# Patient Record
Sex: FEMALE | Race: WHITE | NOT HISPANIC OR LATINO | Employment: FULL TIME
[De-identification: names, ages, dates, MRNs, and addresses within clinical notes are randomized per-mention and may not be internally consistent; named-entity substitution may affect disease eponyms.]

---

## 2017-07-08 ENCOUNTER — HEALTH MAINTENANCE LETTER (OUTPATIENT)
Age: 47
End: 2017-07-08

## 2019-09-13 ENCOUNTER — APPOINTMENT (OUTPATIENT)
Dept: ULTRASOUND IMAGING | Facility: CLINIC | Age: 49
End: 2019-09-13
Attending: EMERGENCY MEDICINE
Payer: COMMERCIAL

## 2019-09-13 ENCOUNTER — HOSPITAL ENCOUNTER (EMERGENCY)
Facility: CLINIC | Age: 49
Discharge: HOME | End: 2019-09-13
Attending: EMERGENCY MEDICINE | Admitting: EMERGENCY MEDICINE
Payer: COMMERCIAL

## 2019-09-13 VITALS
BODY MASS INDEX: 29.05 KG/M2 | DIASTOLIC BLOOD PRESSURE: 75 MMHG | RESPIRATION RATE: 16 BRPM | HEART RATE: 60 BPM | TEMPERATURE: 98.4 F | SYSTOLIC BLOOD PRESSURE: 115 MMHG | WEIGHT: 180 LBS | OXYGEN SATURATION: 99 %

## 2019-09-13 DIAGNOSIS — R10.13 ABDOMINAL PAIN, EPIGASTRIC: ICD-10-CM

## 2019-09-13 LAB
ALBUMIN SERPL-MCNC: 4 G/DL (ref 3.4–5)
ALBUMIN UR-MCNC: NEGATIVE MG/DL
ALP SERPL-CCNC: 66 U/L (ref 40–150)
ALT SERPL W P-5'-P-CCNC: 21 U/L (ref 0–50)
ANION GAP SERPL CALCULATED.3IONS-SCNC: 7 MMOL/L (ref 3–14)
APPEARANCE UR: CLEAR
AST SERPL W P-5'-P-CCNC: 10 U/L (ref 0–45)
BACTERIA #/AREA URNS HPF: ABNORMAL /HPF
BASOPHILS # BLD AUTO: 0 10E9/L (ref 0–0.2)
BASOPHILS NFR BLD AUTO: 0.4 %
BILIRUB SERPL-MCNC: 0.3 MG/DL (ref 0.2–1.3)
BILIRUB UR QL STRIP: NEGATIVE
BUN SERPL-MCNC: 12 MG/DL (ref 7–30)
CALCIUM SERPL-MCNC: 9.6 MG/DL (ref 8.5–10.1)
CHLORIDE SERPL-SCNC: 105 MMOL/L (ref 94–109)
CO2 SERPL-SCNC: 26 MMOL/L (ref 20–32)
COLOR UR AUTO: ABNORMAL
CREAT SERPL-MCNC: 0.66 MG/DL (ref 0.52–1.04)
DIFFERENTIAL METHOD BLD: NORMAL
EOSINOPHIL # BLD AUTO: 0.2 10E9/L (ref 0–0.7)
EOSINOPHIL NFR BLD AUTO: 1.8 %
ERYTHROCYTE [DISTWIDTH] IN BLOOD BY AUTOMATED COUNT: 11.7 % (ref 10–15)
GFR SERPL CREATININE-BSD FRML MDRD: >90 ML/MIN/{1.73_M2}
GLUCOSE SERPL-MCNC: 92 MG/DL (ref 70–99)
GLUCOSE UR STRIP-MCNC: NEGATIVE MG/DL
HCT VFR BLD AUTO: 43.6 % (ref 35–47)
HGB BLD-MCNC: 14.5 G/DL (ref 11.7–15.7)
HGB UR QL STRIP: NEGATIVE
IMM GRANULOCYTES # BLD: 0 10E9/L (ref 0–0.4)
IMM GRANULOCYTES NFR BLD: 0.3 %
KETONES UR STRIP-MCNC: 10 MG/DL
LEUKOCYTE ESTERASE UR QL STRIP: NEGATIVE
LIPASE SERPL-CCNC: 102 U/L (ref 73–393)
LYMPHOCYTES # BLD AUTO: 1.6 10E9/L (ref 0.8–5.3)
LYMPHOCYTES NFR BLD AUTO: 15.5 %
MCH RBC QN AUTO: 30.9 PG (ref 26.5–33)
MCHC RBC AUTO-ENTMCNC: 33.3 G/DL (ref 31.5–36.5)
MCV RBC AUTO: 93 FL (ref 78–100)
MONOCYTES # BLD AUTO: 0.5 10E9/L (ref 0–1.3)
MONOCYTES NFR BLD AUTO: 5.3 %
NEUTROPHILS # BLD AUTO: 7.9 10E9/L (ref 1.6–8.3)
NEUTROPHILS NFR BLD AUTO: 76.7 %
NITRATE UR QL: NEGATIVE
NRBC # BLD AUTO: 0 10*3/UL
NRBC BLD AUTO-RTO: 0 /100
PH UR STRIP: 6 PH (ref 5–7)
PLATELET # BLD AUTO: 339 10E9/L (ref 150–450)
POTASSIUM SERPL-SCNC: 3.8 MMOL/L (ref 3.4–5.3)
PROT SERPL-MCNC: 7.9 G/DL (ref 6.8–8.8)
RBC # BLD AUTO: 4.69 10E12/L (ref 3.8–5.2)
RBC #/AREA URNS AUTO: 0 /HPF (ref 0–2)
SODIUM SERPL-SCNC: 138 MMOL/L (ref 133–144)
SOURCE: ABNORMAL
SP GR UR STRIP: 1.01 (ref 1–1.03)
SQUAMOUS #/AREA URNS AUTO: 1 /HPF (ref 0–1)
UROBILINOGEN UR STRIP-MCNC: NORMAL MG/DL (ref 0–2)
WBC # BLD AUTO: 10.3 10E9/L (ref 4–11)
WBC #/AREA URNS AUTO: 0 /HPF (ref 0–5)

## 2019-09-13 PROCEDURE — 25000128 H RX IP 250 OP 636: Performed by: EMERGENCY MEDICINE

## 2019-09-13 PROCEDURE — 83690 ASSAY OF LIPASE: CPT | Performed by: EMERGENCY MEDICINE

## 2019-09-13 PROCEDURE — 99285 EMERGENCY DEPT VISIT HI MDM: CPT | Mod: 25

## 2019-09-13 PROCEDURE — 96375 TX/PRO/DX INJ NEW DRUG ADDON: CPT

## 2019-09-13 PROCEDURE — 96361 HYDRATE IV INFUSION ADD-ON: CPT

## 2019-09-13 PROCEDURE — 93005 ELECTROCARDIOGRAM TRACING: CPT

## 2019-09-13 PROCEDURE — 85025 COMPLETE CBC W/AUTO DIFF WBC: CPT | Performed by: EMERGENCY MEDICINE

## 2019-09-13 PROCEDURE — 96374 THER/PROPH/DIAG INJ IV PUSH: CPT

## 2019-09-13 PROCEDURE — 81001 URINALYSIS AUTO W/SCOPE: CPT | Performed by: EMERGENCY MEDICINE

## 2019-09-13 PROCEDURE — 76705 ECHO EXAM OF ABDOMEN: CPT

## 2019-09-13 PROCEDURE — 80053 COMPREHEN METABOLIC PANEL: CPT | Performed by: EMERGENCY MEDICINE

## 2019-09-13 PROCEDURE — 36415 COLL VENOUS BLD VENIPUNCTURE: CPT | Performed by: EMERGENCY MEDICINE

## 2019-09-13 RX ORDER — HYDROMORPHONE HYDROCHLORIDE 1 MG/ML
0.5 INJECTION, SOLUTION INTRAMUSCULAR; INTRAVENOUS; SUBCUTANEOUS
Status: COMPLETED | OUTPATIENT
Start: 2019-09-13 | End: 2019-09-13

## 2019-09-13 RX ORDER — SODIUM CHLORIDE 9 MG/ML
1000 INJECTION, SOLUTION INTRAVENOUS CONTINUOUS
Status: DISCONTINUED | OUTPATIENT
Start: 2019-09-13 | End: 2019-09-14 | Stop reason: HOSPADM

## 2019-09-13 RX ORDER — ONDANSETRON 2 MG/ML
4 INJECTION INTRAMUSCULAR; INTRAVENOUS
Status: COMPLETED | OUTPATIENT
Start: 2019-09-13 | End: 2019-09-13

## 2019-09-13 RX ADMIN — HYDROMORPHONE HYDROCHLORIDE 0.5 MG: 1 INJECTION, SOLUTION INTRAMUSCULAR; INTRAVENOUS; SUBCUTANEOUS at 19:30

## 2019-09-13 RX ADMIN — ONDANSETRON 4 MG: 2 INJECTION INTRAMUSCULAR; INTRAVENOUS at 19:30

## 2019-09-13 RX ADMIN — SODIUM CHLORIDE 1000 ML: 9 INJECTION, SOLUTION INTRAVENOUS at 19:31

## 2019-09-13 ASSESSMENT — ENCOUNTER SYMPTOMS
NAUSEA: 0
DYSURIA: 0
DIARRHEA: 0
BACK PAIN: 1
FREQUENCY: 0
DIFFICULTY URINATING: 0
COUGH: 0
ABDOMINAL PAIN: 1
VOMITING: 0
SHORTNESS OF BREATH: 0
CONSTIPATION: 0

## 2019-09-13 NOTE — ED TRIAGE NOTES
Presents with upper abdominal pain, pt reports this starting last evening after eating yogurt at 8 or 9PM. Reports nausea without emesis and denies loose stool. A&O x 4 with VSS on RA.

## 2019-09-13 NOTE — ED AVS SNAPSHOT
[facility] Emergency Department  [address]  [facility] [address]  Phone:  [phone]  Fax:  [phone]                                    Rajni Puente   MRN: [id number]    Department:  [facility] Emergency Department   Date of Visit:  9/13/2019           After Visit Summary Signature Page    I have received my discharge instructions, and my questions have been answered. I have discussed any challenges I see with this plan with the nurse or doctor.    ..........................................................................................................................................  Patient/Patient Representative Signature      ..........................................................................................................................................  Patient Representative Print Name and Relationship to Patient    ..................................................               ................................................  Date                                   Time    ..........................................................................................................................................  Reviewed by Signature/Title    ...................................................              ..............................................  Date                                               Time          22EPIC Rev 08/18

## 2019-09-13 NOTE — ED PROVIDER NOTES
History     Chief Complaint:  Abdominal Pain      HPI   Rajni Puente is a 48 year old female with a history of CAD and hyperlipidemia who presents to the emergency department for evaluation of abdominal pain. The patient reports that last night around 2000 after eating yogurt she had the onset of mild epigastric pain/cramping that has been worsening throughout the day today. The pain worsens with movement, comes in waves, and radiates to her back. Here in the ED, it is currently a 5/10. She took some Pepto Bismol earlier today. Patient denies cough, shortness of breath, fevers, nausea, vomiting, constipation, diarrhea, urinary issues, hx of stomach ulcers, recent trauma, or pain like this before.     Allergies:  No Known Drug Allergies     Medications:    Iron  Levothyroxine  Roxicodone      Past Medical History:    Anemia  CAD  Hypothyroidism  Hyperlipidemia  Migraines   Vitamin B12 and D deficiency   Dysmenorrhea   Obesity     Past Surgical History:    Hysterectomy and salpingectomy bilateral   [place] teeth removal     Family History:    Heart disease: father (MI with quad bypass at age 42), paternal grandmother (MI)  Breast cancer   Lupus   Diabetes   Alcohol abuse  Thyroid disorder   Hyperlipidemia     Social History:  Tobacco Use: Never  Alcohol Use: Rare  PCP: Luiza Choudhury  Marital Status:        Review of Systems   Respiratory: Negative for cough and shortness of breath.    Cardiovascular: Negative for chest pain.   Gastrointestinal: Positive for abdominal pain. Negative for constipation, diarrhea, nausea and vomiting.   Genitourinary: Negative for difficulty urinating, dysuria, frequency and urgency.   Musculoskeletal: Positive for back pain.   All other systems reviewed and are negative.      Physical Exam     Patient Vitals for the past 24 hrs:   BP Temp Temp src Pulse Heart Rate Resp SpO2 Weight   09/13/19 2140 -- -- -- -- -- -- 100 % --   09/13/19 1935 115/75 -- -- 68 -- -- 98 % --   09/13/19  1844 (!) 140/92 98.4  F (36.9  C) Oral 77 77 16 99 % 81.6 kg (180 lb)     Physical Exam    General: The patient is alert, in no respiratory distress. Uncomfortable.     HENT: Mucous membranes moist.    Cardiovascular: Regular rate and rhythm. Good pulses in all four extremities. Normal capillary refill and skin turgor.     Respiratory: Lungs are clear. No nasal flaring. No retractions. No wheezing, no crackles.    Gastrointestinal: Epigastric tenderness.  Abdomen soft. No guarding, no rebound. No palpable hernias.     Musculoskeletal: No gross deformity. Right flank tenderness.     Skin: No rashes or petechiae.     Neurologic: The patient is alert and oriented x3. GCS 15. No testable cranial nerve deficit. Follows commands with clear and appropriate speech. Gives appropriate answers. Good strength in all extremities. No gross neurologic deficit. Gross sensation intact. Pupils are round and reactive. No meningismus.     Lymphatic: No cervical adenopathy. No lower extremity swelling.    Psychiatric: The patient is non-tearful.    Emergency Department Course   ECG:  @ 1920  Indication: Abdominal pain  Vent. Rate 70 bpm. NV interval 158 ms. QRS duration 100 ms. QT/QTc 434/468 ms. P-R-T axis 67 -28 53.   Normal sinus rhythm. Normal ECG.    Read @ 1922 by Dr. Mazariegos.    Imaging:  US Abdomen, limited:  CONCLUSION:  1.  Normal right upper quadrant ultrasound.  Reading per radiology.     Radiographic findings were communicated with the patient who voiced understanding of the findings.    Laboratory:  CBC: WBC: 10.3, HGB: 14.5, PLT: 339  CMP: WNL (Creatinine: 0.66)    Lipase: 102    UA: Clear straw urine, ketones: 10, bacteria: few, otherwise WNL    Interventions:  1930 Zofran 4 mg IV  1930 Dilaudid 0.5 mg IV  1931 0.9% Sodium Chloride BOLUS 1000 mLs IV     Emergency Department Course:  1856 Nursing notes and vitals reviewed. I performed an exam of the patient as documented above.     EKG was done, interpretation as  above.    IV inserted. Medicine administered as documented above. Blood drawn. This was sent to the lab for further testing, results above.    The patient was sent for an abdomen US while in the emergency department, findings above.     2220 I rechecked the patient and discussed the results of her workup thus far.     Findings and plan explained to the Patient. Patient discharged home with instructions regarding supportive care, medications, and reasons to return. The importance of close follow-up was reviewed.    I personally reviewed the laboratory results with the Patient and answered all related questions prior to discharge.     Impression & Plan    Medical Decision Making:  The patient presented complaining of epigastric abdominal pain after eating yogurt.  I did take a broad differential and considered ACS diverticulitis appendicitis bowel obstruction ectopic pregnancy UTI or kidney stone.  The fact that it was after eating yogurt would make me suspicious of biliary colic and ultrasound was ordered which was negative but this does not rule out all biliary causes.  I did not feel she likely had a PE her exam was not consistent with appendicitis she is not pregnant and she because she had a hysterectomy and she was discharged home to close follow-up.  I did stress the need to return if increasing localizing or worse pain  Diagnosis:    ICD-10-CM    1. Abdominal pain, epigastric R10.13        Disposition:  Discharged to home    Scribe Disclosure:  I, Andrew Sanchez, am serving as a scribe on 9/13/2019 at 6:56 PM to personally document services performed by Valeriano King MD based on my observations and the provider's statements to me.     Andrew Sanchez  9/13/2019   [facility] EMERGENCY DEPARTMENT       Valeriano Mazariegos MD  09/13/19 6727

## 2019-09-14 LAB — INTERPRETATION ECG - MUSE: NORMAL

## 2019-11-02 ENCOUNTER — HEALTH MAINTENANCE LETTER (OUTPATIENT)
Age: 49
End: 2019-11-02

## 2020-11-16 ENCOUNTER — HEALTH MAINTENANCE LETTER (OUTPATIENT)
Age: 50
End: 2020-11-16

## 2021-02-06 ENCOUNTER — HEALTH MAINTENANCE LETTER (OUTPATIENT)
Age: 51
End: 2021-02-06

## 2021-09-12 ENCOUNTER — HEALTH MAINTENANCE LETTER (OUTPATIENT)
Age: 51
End: 2021-09-12

## 2022-01-02 ENCOUNTER — HEALTH MAINTENANCE LETTER (OUTPATIENT)
Age: 52
End: 2022-01-02

## 2022-02-27 ENCOUNTER — HEALTH MAINTENANCE LETTER (OUTPATIENT)
Age: 52
End: 2022-02-27

## 2022-11-19 ENCOUNTER — HEALTH MAINTENANCE LETTER (OUTPATIENT)
Age: 52
End: 2022-11-19

## 2022-12-30 ENCOUNTER — HOSPITAL ENCOUNTER (EMERGENCY)
Facility: CLINIC | Age: 52
Discharge: HOME | End: 2022-12-30
Attending: EMERGENCY MEDICINE | Admitting: EMERGENCY MEDICINE
Payer: COMMERCIAL

## 2022-12-30 ENCOUNTER — APPOINTMENT (OUTPATIENT)
Dept: GENERAL RADIOLOGY | Facility: CLINIC | Age: 52
End: 2022-12-30
Attending: EMERGENCY MEDICINE
Payer: COMMERCIAL

## 2022-12-30 VITALS
TEMPERATURE: 98.7 F | HEART RATE: 83 BPM | SYSTOLIC BLOOD PRESSURE: 147 MMHG | OXYGEN SATURATION: 100 % | DIASTOLIC BLOOD PRESSURE: 87 MMHG | RESPIRATION RATE: 19 BRPM

## 2022-12-30 DIAGNOSIS — R07.9 CHEST PAIN, UNSPECIFIED TYPE: ICD-10-CM

## 2022-12-30 DIAGNOSIS — M54.6 ACUTE LEFT-SIDED THORACIC BACK PAIN: ICD-10-CM

## 2022-12-30 LAB
ANION GAP SERPL CALCULATED.3IONS-SCNC: 11 MMOL/L (ref 7–15)
BASOPHILS # BLD AUTO: 0 10E3/UL (ref 0–0.2)
BASOPHILS NFR BLD AUTO: 1 %
BUN SERPL-MCNC: 11.1 MG/DL (ref 6–20)
CALCIUM SERPL-MCNC: 9.9 MG/DL (ref 8.6–10)
CHLORIDE SERPL-SCNC: 103 MMOL/L (ref 98–107)
CREAT SERPL-MCNC: 0.69 MG/DL (ref 0.51–0.95)
DEPRECATED HCO3 PLAS-SCNC: 25 MMOL/L (ref 22–29)
EOSINOPHIL # BLD AUTO: 0.1 10E3/UL (ref 0–0.7)
EOSINOPHIL NFR BLD AUTO: 2 %
ERYTHROCYTE [DISTWIDTH] IN BLOOD BY AUTOMATED COUNT: 12.4 % (ref 10–15)
GFR SERPL CREATININE-BSD FRML MDRD: >90 ML/MIN/1.73M2
GLUCOSE SERPL-MCNC: 100 MG/DL (ref 70–99)
HCT VFR BLD AUTO: 40.3 % (ref 35–47)
HGB BLD-MCNC: 12.8 G/DL (ref 11.7–15.7)
HOLD SPECIMEN: NORMAL
HOLD SPECIMEN: NORMAL
IMM GRANULOCYTES # BLD: 0 10E3/UL
IMM GRANULOCYTES NFR BLD: 0 %
LYMPHOCYTES # BLD AUTO: 1.8 10E3/UL (ref 0.8–5.3)
LYMPHOCYTES NFR BLD AUTO: 25 %
MCH RBC QN AUTO: 28.9 PG (ref 26.5–33)
MCHC RBC AUTO-ENTMCNC: 31.8 G/DL (ref 31.5–36.5)
MCV RBC AUTO: 91 FL (ref 78–100)
MONOCYTES # BLD AUTO: 0.6 10E3/UL (ref 0–1.3)
MONOCYTES NFR BLD AUTO: 9 %
NEUTROPHILS # BLD AUTO: 4.6 10E3/UL (ref 1.6–8.3)
NEUTROPHILS NFR BLD AUTO: 63 %
NRBC # BLD AUTO: 0 10E3/UL
NRBC BLD AUTO-RTO: 0 /100
PLATELET # BLD AUTO: 386 10E3/UL (ref 150–450)
POTASSIUM SERPL-SCNC: 4 MMOL/L (ref 3.4–5.3)
RBC # BLD AUTO: 4.43 10E6/UL (ref 3.8–5.2)
SODIUM SERPL-SCNC: 139 MMOL/L (ref 136–145)
TROPONIN T SERPL HS-MCNC: <6 NG/L
WBC # BLD AUTO: 7.2 10E3/UL (ref 4–11)

## 2022-12-30 PROCEDURE — 93005 ELECTROCARDIOGRAM TRACING: CPT

## 2022-12-30 PROCEDURE — 71046 X-RAY EXAM CHEST 2 VIEWS: CPT

## 2022-12-30 PROCEDURE — 85025 COMPLETE CBC W/AUTO DIFF WBC: CPT | Performed by: EMERGENCY MEDICINE

## 2022-12-30 PROCEDURE — 99285 EMERGENCY DEPT VISIT HI MDM: CPT | Mod: 25

## 2022-12-30 PROCEDURE — 36415 COLL VENOUS BLD VENIPUNCTURE: CPT | Performed by: EMERGENCY MEDICINE

## 2022-12-30 PROCEDURE — 80048 BASIC METABOLIC PNL TOTAL CA: CPT | Performed by: EMERGENCY MEDICINE

## 2022-12-30 PROCEDURE — 84484 ASSAY OF TROPONIN QUANT: CPT | Performed by: EMERGENCY MEDICINE

## 2022-12-30 ASSESSMENT — ENCOUNTER SYMPTOMS
SHORTNESS OF BREATH: 1
NAUSEA: 0
HEADACHES: 0
BACK PAIN: 1
VOMITING: 0
NUMBNESS: 0

## 2022-12-30 ASSESSMENT — ACTIVITIES OF DAILY LIVING (ADL): ADLS_ACUITY_SCORE: 35

## 2022-12-30 NOTE — ED PROVIDER NOTES
History   Chief Complaint:  Back Pain    The history is provided by the patient.      Rajni Puente is a 52 year old female with history of hyperlipidemia and hypothyroidism who presents with back pain. The patient reports waking up this morning with left thoracic back pain, which has been worsening throughout the day. She was not doing anything in particular when the pain first developed, and says that earlier in the day the pain radiated through into her chest, but not anymore. She also notes some exertional shortness of breath while going up stairs today, which is not normal for her. She can not think of any exacerbating or alleviating factors except increase in pain with certain movements. She denies any headache, arm pain, numbness, nausea, vomiting, pain with deep breathing, or recent changes in activity or exercise. No family history of clotting disorders. No personal history of blood clots. No recent travel. Denies any coughing up blood or personal history of cancer. She is not on birth control or any form of estrogen supplementation.    Review of Systems   Respiratory: Positive for shortness of breath.    Gastrointestinal: Negative for nausea and vomiting.   Musculoskeletal: Positive for back pain.   Neurological: Negative for numbness and headaches.   All other systems reviewed and are negative.    Allergies:  The patient has no known allergies.     Medications:  Synthroid     Past Medical History:     Hypothyroidism  Obesity  Hyperlipidemia   Migraines   Vitamin D deficiency  Vitamin B12 deficiency     Past Surgical History:    Laparoscopic hysterectomy total, salpingectomy bilateral   [place] teeth extraction     Family History:    Father: MI with CABG x4, unknown cancer     Social History:  Presents to the ED with female .   PCP: Luiza Choudhury     Physical Exam     Patient Vitals for the past 24 hrs:   BP Temp Temp src Pulse Resp SpO2   12/30/22 1610 (!) 147/87 98.7  F (37.1  C) Temporal 83  19 100 %       Physical Exam    Eyes:    Conjunctiva normal  Neck:    Supple, no meningismus.     CV:     Regular rate and rhythm.      No murmurs, rubs or gallops.       No unilateral leg swelling.       2+ radial pulses bilateral.       No lower extremity edema.  PULM:    Clear to auscultation bilateral.       No respiratory distress.      Good air exchange.     No rales or wheezing.     No stridor.  ABD:    Soft, non-tender, non-distended.       No pulsatile masses.       No rebound, guarding or rigidity.  MSK:     Focal area of tenderness that reproduces the patient's pain just medial to the inferior border of the left scapula     No tenderness of the thoracic spine  LYMPH:   No cervical lymphadenopathy.  NEURO:   Alert. Good muscle tone, no atrophy.  Skin:    Warm, dry and intact.    Psych:    Mood is good and affect is appropriate.        Emergency Department Course   ECG  ECG taken at 1634, ECG read at 1754  Normal sinus rhythm. Left axis deviation.    No significant change as compared to prior, dated 9/13/19.  Rate 72 bpm. RI interval 148 ms. QRS duration 88 ms. QT/QTc 404/442 ms. P-R-T axes 55 -38 45.     Imaging:  Chest XR,  PA & LAT   Final Result   IMPRESSION: Negative chest. Lungs are clear. No obvious rib fracture.              Report per radiology    Laboratory:  Labs Ordered and Resulted from Time of ED Arrival to Time of ED Departure   BASIC METABOLIC PANEL - Abnormal       Result Value    Sodium 139      Potassium 4.0      Chloride 103      Carbon Dioxide (CO2) 25      Anion Gap 11      Urea Nitrogen 11.1      Creatinine 0.69      Calcium 9.9      Glucose 100 (*)     GFR Estimate >90     TROPONIN T, HIGH SENSITIVITY - Normal    Troponin T, High Sensitivity <6     CBC WITH PLATELETS AND DIFFERENTIAL    WBC Count 7.2      RBC Count 4.43      Hemoglobin 12.8      Hematocrit 40.3      MCV 91      MCH 28.9      MCHC 31.8      RDW 12.4      Platelet Count 386      % Neutrophils 63      % Lymphocytes 25       % Monocytes 9      % Eosinophils 2      % Basophils 1      % Immature Granulocytes 0      NRBCs per 100 WBC 0      Absolute Neutrophils 4.6      Absolute Lymphocytes 1.8      Absolute Monocytes 0.6      Absolute Eosinophils 0.1      Absolute Basophils 0.0      Absolute Immature Granulocytes 0.0      Absolute NRBCs 0.0          Emergency Department Course:       Reviewed:  I reviewed nursing notes, vitals, past medical history and Care Everywhere    Assessments:  1750 I obtained history and examined the patient as noted above.    I rechecked the patient and explained findings. Prepared for discharge.        Disposition:  The patient was discharged to home.     Impression & Plan       Medical Decision Makin-year-old female seen the ED with atraumatic left thoracic back pain radiating to the left axilla/chest.  She was evaluated for atypical ACS.  EKG without ischemic changes.  Troponin within normal limits despite greater than 6 hours of constant pain thus ruling out MI and no indication for serial enzymes.  Very low suspicion for pulmonary embolism.  She has no risk factors.  Vital signs and EKG are reassuring.  She has reproducible tenderness on examination thus I do not believe that D-dimer or CT scan of her chest is indicated  No features concerning for aortic dissection or aortic aneurysm.  Primary concern is for musculoskeletal source.  Patient to use Tylenol and ibuprofen as needed for pain.  Close follow-up with primary care physician and return to ED for any worsening symptoms    Diagnosis:    ICD-10-CM    1. Chest pain, unspecified type  R07.9       2. Acute left-sided thoracic back pain  M54.6           Discharge Medications:  Discharge Medication List as of 2022  7:02 PM          Scribe Disclosure:  Chito EDWARD, am serving as a scribe at 5:52 PM on 2022 to document services personally performed by Fernando Hernandez MD based on my observations and the provider's statements to  me.            Fernando Hernandez MD  12/30/22 2128

## 2022-12-30 NOTE — ED TRIAGE NOTES
"Left sided chest pain started yesterday. C/o left dull back pain, under shoulder blade. Pt describes it as \"the muscle is seizing.\" SOB when walking up stairs. Denies fever, N/V/D, abdominal pain. VSS. ABCs intact. A/Ox4.       "

## 2023-01-02 LAB
ATRIAL RATE - MUSE: 72 BPM
DIASTOLIC BLOOD PRESSURE - MUSE: NORMAL MMHG
INTERPRETATION ECG - MUSE: NORMAL
P AXIS - MUSE: 55 DEGREES
PR INTERVAL - MUSE: 148 MS
QRS DURATION - MUSE: 88 MS
QT - MUSE: 404 MS
QTC - MUSE: 442 MS
R AXIS - MUSE: -38 DEGREES
SYSTOLIC BLOOD PRESSURE - MUSE: NORMAL MMHG
T AXIS - MUSE: 45 DEGREES
VENTRICULAR RATE- MUSE: 72 BPM

## 2023-07-02 ENCOUNTER — HEALTH MAINTENANCE LETTER (OUTPATIENT)
Age: 53
End: 2023-07-02

## 2024-01-28 ENCOUNTER — HEALTH MAINTENANCE LETTER (OUTPATIENT)
Age: 54
End: 2024-01-28

## 2024-08-24 ENCOUNTER — HEALTH MAINTENANCE LETTER (OUTPATIENT)
Age: 54
End: 2024-08-24

## 2024-12-29 ENCOUNTER — HEALTH MAINTENANCE LETTER (OUTPATIENT)
Age: 54
End: 2024-12-29